# Patient Record
Sex: FEMALE | Race: BLACK OR AFRICAN AMERICAN | NOT HISPANIC OR LATINO | Employment: FULL TIME | URBAN - METROPOLITAN AREA
[De-identification: names, ages, dates, MRNs, and addresses within clinical notes are randomized per-mention and may not be internally consistent; named-entity substitution may affect disease eponyms.]

---

## 2023-11-28 ENCOUNTER — LAB (OUTPATIENT)
Dept: LAB | Facility: LAB | Age: 28
End: 2023-11-28
Payer: COMMERCIAL

## 2023-11-28 ENCOUNTER — DOCUMENTATION (OUTPATIENT)
Dept: OBSTETRICS AND GYNECOLOGY | Facility: HOSPITAL | Age: 28
End: 2023-11-28

## 2023-11-28 ENCOUNTER — INITIAL PRENATAL (OUTPATIENT)
Dept: OBSTETRICS AND GYNECOLOGY | Facility: HOSPITAL | Age: 28
End: 2023-11-28
Payer: COMMERCIAL

## 2023-11-28 ENCOUNTER — HOSPITAL ENCOUNTER (OUTPATIENT)
Dept: RADIOLOGY | Facility: HOSPITAL | Age: 28
Discharge: HOME | End: 2023-11-28
Payer: COMMERCIAL

## 2023-11-28 ENCOUNTER — PHARMACY VISIT (OUTPATIENT)
Dept: PHARMACY | Facility: CLINIC | Age: 28
End: 2023-11-28

## 2023-11-28 ENCOUNTER — TELEPHONE (OUTPATIENT)
Dept: OBSTETRICS AND GYNECOLOGY | Facility: HOSPITAL | Age: 28
End: 2023-11-28

## 2023-11-28 VITALS — WEIGHT: 149 LBS | SYSTOLIC BLOOD PRESSURE: 118 MMHG | DIASTOLIC BLOOD PRESSURE: 70 MMHG

## 2023-11-28 DIAGNOSIS — Z34.82 SUPERVISION OF NORMAL INTRAUTERINE PREGNANCY IN MULTIGRAVIDA IN SECOND TRIMESTER (HHS-HCC): Primary | ICD-10-CM

## 2023-11-28 DIAGNOSIS — Z34.82 SUPERVISION OF NORMAL INTRAUTERINE PREGNANCY IN MULTIGRAVIDA IN SECOND TRIMESTER (HHS-HCC): ICD-10-CM

## 2023-11-28 DIAGNOSIS — R45.89 DEPRESSED MOOD: Primary | ICD-10-CM

## 2023-11-28 DIAGNOSIS — R45.89 DEPRESSED MOOD: ICD-10-CM

## 2023-11-28 DIAGNOSIS — Z59.41 FOOD INSECURITY: ICD-10-CM

## 2023-11-28 LAB
ABO GROUP (TYPE) IN BLOOD: NORMAL
ANTIBODY SCREEN: NORMAL
ERYTHROCYTE [DISTWIDTH] IN BLOOD BY AUTOMATED COUNT: 11.6 % (ref 11.5–14.5)
HBV SURFACE AG SERPL QL IA: NONREACTIVE
HCT VFR BLD AUTO: 33.8 % (ref 36–46)
HCV AB SER QL: NONREACTIVE
HGB BLD-MCNC: 11.1 G/DL (ref 12–16)
HIV 1+2 AB+HIV1 P24 AG SERPL QL IA: NONREACTIVE
MCH RBC QN AUTO: 32.3 PG (ref 26–34)
MCHC RBC AUTO-ENTMCNC: 32.8 G/DL (ref 32–36)
MCV RBC AUTO: 98 FL (ref 80–100)
NRBC BLD-RTO: 0 /100 WBCS (ref 0–0)
PLATELET # BLD AUTO: 224 X10*3/UL (ref 150–450)
RBC # BLD AUTO: 3.44 X10*6/UL (ref 4–5.2)
REFLEX ADDED, ANEMIA PANEL: NORMAL
RH FACTOR (ANTIGEN D): NORMAL
RUBV IGG SERPL IA-ACNC: 4.6 IA
RUBV IGG SERPL QL IA: POSITIVE
T PALLIDUM AB SER QL: NONREACTIVE
WBC # BLD AUTO: 7.9 X10*3/UL (ref 4.4–11.3)

## 2023-11-28 PROCEDURE — 87340 HEPATITIS B SURFACE AG IA: CPT

## 2023-11-28 PROCEDURE — 83020 HEMOGLOBIN ELECTROPHORESIS: CPT

## 2023-11-28 PROCEDURE — 87800 DETECT AGNT MULT DNA DIREC: CPT

## 2023-11-28 PROCEDURE — 86900 BLOOD TYPING SEROLOGIC ABO: CPT

## 2023-11-28 PROCEDURE — 88141 CYTOPATH C/V INTERPRET: CPT | Performed by: PATHOLOGY

## 2023-11-28 PROCEDURE — 87389 HIV-1 AG W/HIV-1&-2 AB AG IA: CPT

## 2023-11-28 PROCEDURE — 88175 CYTOPATH C/V AUTO FLUID REDO: CPT | Mod: TC

## 2023-11-28 PROCEDURE — 36415 COLL VENOUS BLD VENIPUNCTURE: CPT

## 2023-11-28 PROCEDURE — 99215 OFFICE O/P EST HI 40 MIN: CPT | Mod: 25

## 2023-11-28 PROCEDURE — 86317 IMMUNOASSAY INFECTIOUS AGENT: CPT

## 2023-11-28 PROCEDURE — 85027 COMPLETE CBC AUTOMATED: CPT

## 2023-11-28 PROCEDURE — 76815 OB US LIMITED FETUS(S): CPT

## 2023-11-28 PROCEDURE — 87661 TRICHOMONAS VAGINALIS AMPLIF: CPT

## 2023-11-28 PROCEDURE — 99205 OFFICE O/P NEW HI 60 MIN: CPT

## 2023-11-28 PROCEDURE — 83021 HEMOGLOBIN CHROMOTOGRAPHY: CPT

## 2023-11-28 PROCEDURE — 86803 HEPATITIS C AB TEST: CPT

## 2023-11-28 PROCEDURE — 87624 HPV HI-RISK TYP POOLED RSLT: CPT | Mod: 59

## 2023-11-28 PROCEDURE — 86850 RBC ANTIBODY SCREEN: CPT

## 2023-11-28 PROCEDURE — 87086 URINE CULTURE/COLONY COUNT: CPT

## 2023-11-28 PROCEDURE — 86901 BLOOD TYPING SEROLOGIC RH(D): CPT

## 2023-11-28 PROCEDURE — 76815 OB US LIMITED FETUS(S): CPT | Performed by: OBSTETRICS & GYNECOLOGY

## 2023-11-28 PROCEDURE — 86780 TREPONEMA PALLIDUM: CPT

## 2023-11-28 RX ORDER — ONDANSETRON 4 MG/1
4 TABLET, ORALLY DISINTEGRATING ORAL EVERY 8 HOURS PRN
Qty: 20 TABLET | Refills: 0 | Status: SHIPPED | OUTPATIENT
Start: 2023-11-28 | End: 2023-12-05

## 2023-11-28 RX ORDER — NAPROXEN SODIUM 220 MG/1
162 TABLET, FILM COATED ORAL DAILY
Qty: 180 TABLET | Refills: 3 | Status: SHIPPED | OUTPATIENT
Start: 2023-11-28 | End: 2024-11-27

## 2023-11-28 SDOH — ECONOMIC STABILITY - FOOD INSECURITY: FOOD INSECURITY: Z59.41

## 2023-11-28 ASSESSMENT — EDINBURGH POSTNATAL DEPRESSION SCALE (EPDS)
I HAVE BEEN SO UNHAPPY THAT I HAVE HAD DIFFICULTY SLEEPING: YES, MOST OF THE TIME
I HAVE FELT SCARED OR PANICKY FOR NO GOOD REASON: YES, SOMETIMES
I HAVE FELT SAD OR MISERABLE: YES, QUITE OFTEN
TOTAL SCORE: 22
I HAVE BLAMED MYSELF UNNECESSARILY WHEN THINGS WENT WRONG: YES, MOST OF THE TIME
I HAVE BEEN ANXIOUS OR WORRIED FOR NO GOOD REASON: YES, VERY OFTEN
THE THOUGHT OF HARMING MYSELF HAS OCCURRED TO ME: SOMETIMES
THINGS HAVE BEEN GETTING ON TOP OF ME: YES, MOST OF THE TIME I HAVEN'T BEEN ABLE TO COPE AT ALL
I HAVE BEEN ABLE TO LAUGH AND SEE THE FUNNY SIDE OF THINGS: DEFINITELY NOT SO MUCH NOW
I HAVE LOOKED FORWARD WITH ENJOYMENT TO THINGS: RATHER LESS THAN I USED TO
I HAVE BEEN SO UNHAPPY THAT I HAVE BEEN CRYING: ONLY OCCASIONALLY

## 2023-11-28 NOTE — PROGRESS NOTES
Assessment   Problem List Items Addressed This Visit             ICD-10-CM    Depressed mood R45.89     [place] 22; patient agreeable to  IOP; tasked RN pool for appointment ASAP.         Food insecurity Z59.41     [facility] referral placed         Relevant Orders    Referral to [facility]     Other Visit Diagnoses         Codes    Supervision of normal intrauterine pregnancy in multigravida in second trimester    -  Primary Z34.82    Relevant Medications    ondansetron ODT (Zofran-ODT) 4 mg disintegrating tablet    doxylamine (Unisom) 25 mg tablet    aspirin 81 mg chewable tablet    prenatal vitamin, iron-folic, 27 mg iron-800 mcg folic acid tablet    Other Relevant Orders    [facility] OB imaging order    CBC Anemia Panel With Reflex,Pregnancy    Type And Screen    HEMOGLOBIN IDENTIFICATION WITH PATH REVIEW    Hepatitis B surface antigen    Hepatitis C antibody    Rubella Antibody, IgG    Syphilis Screen with Reflex    HIV 1/2 Antigen/Antibody Screen with Reflex to Confirmation    Urine Culture    C. Trachomatis / N. Gonorrhoeae, Amplified Detection    Trichomonas vaginalis, Nucleic Acid Detection    THINPREP PAP    Follow Up In Obstetrics            Plan   NOB plan: New OB resources provided and reviewed with particular attention to dietary, travel, and medication restrictions  Oriented to practice, CNM vs. MD care  Reviewed IOM recommendations for weight gain given pt's BMI: 25-35 pounds (BMI 18.5 - 24.9)  Reviewed bleeding precautions, warning signs, when to call provider; phone number provided  Discussed bASA for PEC prophylaxis  The following Rx were sent to pharmacy: PNV, zofran, unisom, baby aspirin  Routine NOB labs ordered  Dating ultrasound ordered  [place] 22; has no SI or HI today; tasked RN pool for engagement with  IOP ASAP  Return in 4 weeks for routine prenatal care    FELECIA Bustillos-JUDY    Subjective   Skinny Arreola is a 28 y.o.  at 14w3d with a  working estimated date of delivery of 2024, by Ultrasound who presents for an initial prenatal visit. This pregnancy is unplanned.    Patient currently experiencing: nausea/vomitting, desires anti-emetics, fatigue, depression  Bleeding or cramping since LMP: no  Taking prenatal vitamin: No  Ultrasound completed this pregnancy: Yes - outside of ; PAZ 2024    Last pap:     OB History    Para Term  AB Living   3 1 1 0 1 1   SAB IAB Ectopic Multiple Live Births   1 0 0 0 1      # Outcome Date GA Lbr [place]/2nd Weight Sex Delivery Anes PTL Lv   3 Current            2 Term 21    M Vag-Spont EPI  KOJO   1 SAB     U    FD     [place]  Depression Scale Total: 22  Prior pregnancy complications: pre-eclampsia    History of hypertension:  Yes, preeclampsia    Past Medical History:   Diagnosis Date    Chlamydia       History reviewed. No pertinent surgical history.   Social History     Socioeconomic History    Marital status: Single     Spouse name: None    Number of children: None    Years of education: None    Highest education level: None   Occupational History    None   Tobacco Use    Smoking status: Every Day     Types: Cigarettes    Smokeless tobacco: Never   Vaping Use    Vaping Use: Former   Substance and Sexual Activity    Alcohol use: Yes     Alcohol/week: 1.0 standard drink of alcohol     Types: 1 Glasses of wine per week    Drug use: Yes     Types: Marijuana    Sexual activity: Yes     Partners: Male   Other Topics Concern    None   Social History Narrative    None     Social Determinants of Health     Financial Resource Strain: Not on file   Food Insecurity: Not on file   Transportation Needs: Not on file   Physical Activity: Not on file   Stress: Not on file   Social Connections: Not on file   Intimate Partner Violence: Not on file        Objective   Physical Exam  Weight: 67.6 kg (149 lb)  TWG: Not found.   Expected Total Weight Gain: Could not be calculated   Pregravid  BMI: Could not be calculated  BP: 118/70  FHR: 145bpm    Review of Systems   All other systems reviewed and are negative.       Physical Exam  Constitutional:       Appearance: Normal appearance.   Genitourinary:      Vulva, bladder, rectum and urethral meatus normal.      Vaginal cuff intact.     No vaginal prolapse present.     No vaginal atrophy present.  HENT:      Head: Normocephalic and atraumatic.      Nose: Nose normal.      Mouth/Throat:      Mouth: Mucous membranes are moist.   Cardiovascular:      Rate and Rhythm: Normal rate and regular rhythm.      Heart sounds: Normal heart sounds.   Pulmonary:      Effort: Pulmonary effort is normal.      Breath sounds: Normal breath sounds.   Abdominal:      Comments: gravid   Musculoskeletal:         General: Normal range of motion.      Cervical back: Normal range of motion and neck supple.   Neurological:      Mental Status: She is alert and oriented to person, place, and time.   Skin:     General: Skin is warm and dry.   Psychiatric:         Mood and Affect: Mood normal.         Behavior: Behavior normal.         Thought Content: Thought content normal.         Judgment: Judgment normal.          Postpartum Depression: High Risk (2023)    [place]  Depression Scale     Last EPDS Total Score: 22     Last EPDS Self Harm Result: Sometimes        Pregnancy Problems (from 23 to present)       No problems associated with this episode.

## 2023-11-28 NOTE — ASSESSMENT & PLAN NOTE
[facility] referral placed  
[place] 22; patient agreeable to  IOP; tasked RN pool for appointment ASAP.  
Former smoker

## 2023-11-28 NOTE — TELEPHONE ENCOUNTER
----- Message from SANGEETA Bustillos sent at 2023 12:04 PM EST -----  Regarding: Perintatal IOP  Please set this patient up with  IOP ASAP.  [place] 22.  She states her phone is having issues right now, so I'm not sure if there is another way for them to reach out to her.    Thank you.    SANGEETA Bustillos

## 2023-11-29 ENCOUNTER — TELEPHONE (OUTPATIENT)
Dept: OBSTETRICS AND GYNECOLOGY | Facility: HOSPITAL | Age: 28
End: 2023-11-29
Payer: COMMERCIAL

## 2023-11-29 ENCOUNTER — PHARMACY VISIT (OUTPATIENT)
Dept: PHARMACY | Facility: CLINIC | Age: 28
End: 2023-11-29
Payer: MEDICAID

## 2023-11-29 DIAGNOSIS — O23.592 TRICHOMONAL VAGINITIS DURING PREGNANCY IN SECOND TRIMESTER (HHS-HCC): Primary | ICD-10-CM

## 2023-11-29 DIAGNOSIS — A59.01 TRICHOMONAL VAGINITIS DURING PREGNANCY IN SECOND TRIMESTER (HHS-HCC): Primary | ICD-10-CM

## 2023-11-29 LAB
C TRACH RRNA SPEC QL NAA+PROBE: NEGATIVE
HEMOGLOBIN A2: 1.6 % (ref 2–3.5)
HEMOGLOBIN A: 96 % (ref 95.8–98)
HEMOGLOBIN F: 1.2 % (ref 0–2)
HEMOGLOBIN IDENTIFICATION INTERPRETATION: ABNORMAL
HEMOGLOBIN OTHER: 1.2 %
N GONORRHOEA DNA SPEC QL PROBE+SIG AMP: NEGATIVE
PATH REVIEW-HGB IDENTIFICATION: ABNORMAL
T VAGINALIS RRNA SPEC QL NAA+PROBE: POSITIVE

## 2023-11-29 RX ORDER — METRONIDAZOLE 500 MG/1
500 TABLET ORAL 2 TIMES DAILY
Qty: 14 TABLET | Refills: 0 | Status: SHIPPED | OUTPATIENT
Start: 2023-11-29 | End: 2023-12-06

## 2023-11-29 NOTE — TELEPHONE ENCOUNTER
Attempt to call patient can't receive call at this time    ----- Message from SANGEETA Bustillos sent at 11/29/2023  1:37 PM EST -----  Regarding: trich results  This patient saw me for a NOB yesterday and she is positive for trich.  Please call her and let her know that I sent medication to the pharmacy for her and that she will need a test of cure at her next visit.    SANGEETA Bustillos

## 2023-11-30 LAB — BACTERIA UR CULT: ABNORMAL

## 2023-12-01 NOTE — TELEPHONE ENCOUNTER
Several attempts have been made to contact the patient to give results and notify medication sent to pharmacy  RN called patient at the number listed in her chart on 11/29, 11/30, and on 12/01  A letter was sent on 12/1 encouraging patient to call for results.   [phone]  This same letter provided information on the importance of the return call for results  No further attempts will be made to contact patient, provider notified via task  PEDRO Brandt      ----- Message from SANGEETA Bustillos sent at 11/29/2023  1:37 PM EST -----  Regarding: trich results  This patient saw me for a NOB yesterday and she is positive for trich.  Please call her and let her know that I sent medication to the pharmacy for her and that she will need a test of cure at her next visit.    SANGEETA Bustillos

## 2023-12-06 PROBLEM — A59.01 TRICHOMONAL VAGINITIS DURING PREGNANCY IN SECOND TRIMESTER (HHS-HCC): Status: ACTIVE | Noted: 2023-12-06

## 2023-12-06 PROBLEM — D64.9: Status: ACTIVE | Noted: 2023-12-06

## 2023-12-06 PROBLEM — O23.592 TRICHOMONAL VAGINITIS DURING PREGNANCY IN SECOND TRIMESTER (HHS-HCC): Status: ACTIVE | Noted: 2023-12-06

## 2023-12-15 LAB
CYTOLOGY CMNT CVX/VAG CYTO-IMP: NORMAL
HPV HR 12 DNA GENITAL QL NAA+PROBE: NEGATIVE
HPV HR GENOTYPES PNL CVX NAA+PROBE: NEGATIVE
HPV16 DNA SPEC QL NAA+PROBE: NEGATIVE
HPV18 DNA SPEC QL NAA+PROBE: NEGATIVE
LAB AP HPV GENOTYPE QUESTION: YES
LAB AP HPV HR: NORMAL
LABORATORY COMMENT REPORT: NORMAL
PATH REPORT.TOTAL CANCER: NORMAL

## 2024-01-02 ENCOUNTER — HOSPITAL ENCOUNTER (OUTPATIENT)
Dept: RADIOLOGY | Facility: HOSPITAL | Age: 29
Discharge: HOME | End: 2024-01-02
Payer: COMMERCIAL

## 2024-01-02 ENCOUNTER — APPOINTMENT (OUTPATIENT)
Dept: OBSTETRICS AND GYNECOLOGY | Facility: HOSPITAL | Age: 29
End: 2024-01-02
Payer: COMMERCIAL

## 2024-01-02 ENCOUNTER — TELEPHONE (OUTPATIENT)
Dept: OBSTETRICS AND GYNECOLOGY | Facility: HOSPITAL | Age: 29
End: 2024-01-02

## 2024-01-02 DIAGNOSIS — O99.330 SMOKING (TOBACCO) COMPLICATING PREGNANCY, UNSPECIFIED TRIMESTER (HHS-HCC): ICD-10-CM

## 2024-01-02 DIAGNOSIS — Z36.89 SCREENING, ANTENATAL, FOR FETAL ANATOMIC SURVEY (HHS-HCC): ICD-10-CM

## 2024-01-02 DIAGNOSIS — Z3A.19 19 WEEKS GESTATION OF PREGNANCY (HHS-HCC): ICD-10-CM

## 2024-01-02 PROCEDURE — 76805 OB US >/= 14 WKS SNGL FETUS: CPT

## 2024-01-02 PROCEDURE — 76805 OB US >/= 14 WKS SNGL FETUS: CPT | Performed by: OBSTETRICS & GYNECOLOGY

## 2024-01-17 ENCOUNTER — DOCUMENTATION (OUTPATIENT)
Dept: OBSTETRICS AND GYNECOLOGY | Facility: HOSPITAL | Age: 29
End: 2024-01-17
Payer: MEDICAID

## 2024-01-17 NOTE — PROGRESS NOTES
RN received returned cert letter [id number] [phone]  Return to sender attempted not know unable to forward  RAMAKRISHNA BrandtN-RN

## 2024-01-30 ENCOUNTER — ROUTINE PRENATAL (OUTPATIENT)
Dept: OBSTETRICS AND GYNECOLOGY | Facility: HOSPITAL | Age: 29
End: 2024-01-30
Payer: COMMERCIAL

## 2024-01-30 VITALS — DIASTOLIC BLOOD PRESSURE: 71 MMHG | WEIGHT: 160 LBS | SYSTOLIC BLOOD PRESSURE: 114 MMHG

## 2024-01-30 DIAGNOSIS — R45.89 DEPRESSED MOOD: ICD-10-CM

## 2024-01-30 DIAGNOSIS — Z3A.23 23 WEEKS GESTATION OF PREGNANCY (HHS-HCC): Primary | ICD-10-CM

## 2024-01-30 DIAGNOSIS — Z34.82 SUPERVISION OF NORMAL INTRAUTERINE PREGNANCY IN MULTIGRAVIDA IN SECOND TRIMESTER (HHS-HCC): ICD-10-CM

## 2024-01-30 PROCEDURE — 87661 TRICHOMONAS VAGINALIS AMPLIF: CPT | Performed by: ADVANCED PRACTICE MIDWIFE

## 2024-01-30 PROCEDURE — 87086 URINE CULTURE/COLONY COUNT: CPT | Performed by: ADVANCED PRACTICE MIDWIFE

## 2024-01-30 ASSESSMENT — ENCOUNTER SYMPTOMS
EYES NEGATIVE: 0
GASTROINTESTINAL NEGATIVE: 0
HEMATOLOGIC/LYMPHATIC NEGATIVE: 0
PSYCHIATRIC NEGATIVE: 0
NEUROLOGICAL NEGATIVE: 0
CONSTITUTIONAL NEGATIVE: 0
ENDOCRINE NEGATIVE: 0
MUSCULOSKELETAL NEGATIVE: 0
ALLERGIC/IMMUNOLOGIC NEGATIVE: 0
CARDIOVASCULAR NEGATIVE: 0
RESPIRATORY NEGATIVE: 0

## 2024-01-30 NOTE — PROGRESS NOTES
Subjective     Skinny Arreola is a 28 y.o.  at 23w3d with a working estimated date of delivery of 2024, by Ultrasound who presents for a routine prenatal visit.     She denies vaginal bleeding, leakage of fluid, decreased fetal movements, or contractions.    Starting to have heartburn   Seen @ [facility] for round ligament pain a couple weeks ago   Difficulty staying asleep at night - falls asleep at 8-9pm, wakes up every 1-2 hours    Hx of depression - Talks to counselor over the phone, no meds  Trying to get some time for herself, more time out of the house   Denies SI/HI   Reports mood is a little better     Did not know about +Trich> Reviewed attempted phone calls to patient. Advised to check phone number with      Objective   Physical Exam  Weight: 72.6 kg (160 lb)  Expected Total Weight Gain: Could not be calculated   Pregravid BMI: Could not be calculated  BP: 114/71  Fetal Heart Rate: 154 Fundal Height (cm): 24 cm    Postpartum Depression: High Risk (2023)    [place]  Depression Scale     Last EPDS Total Score: 22     Last EPDS Self Harm Result: Sometimes       Problem List Items Addressed This Visit       Depressed mood    Supervision of normal intrauterine pregnancy in multigravida in second trimester    Relevant Orders    Trichomonas vaginalis, Nucleic Acid Detection    Urine Culture    23 weeks gestation of pregnancy - Primary       Discussed diabetes screening and routine labs, to be completed next visit  Repeat urine culture  for possible contamination   Reviewed normal anatomy US  Reviewed +Trich --> patient reports she was unaware and requests another test for confirmation. Culture sent   Declines additional mental health resources at this time   -Reviewed reasons to call CNM on-call: decreased fetal movement, vaginal bleeding, loss of fluid, increased pelvic pain/contractions, or any questions/concerns  -RTC in 3 weeks or prn    Roxanne Villavicencio, FELECIA-JUDY, APRN-CNP

## 2024-01-31 LAB — T VAGINALIS RRNA SPEC QL NAA+PROBE: POSITIVE

## 2024-02-01 ENCOUNTER — TELEPHONE (OUTPATIENT)
Dept: OBSTETRICS AND GYNECOLOGY | Facility: HOSPITAL | Age: 29
End: 2024-02-01
Payer: MEDICAID

## 2024-02-01 DIAGNOSIS — A59.9 TRICHOMONAL INFECTION: Primary | ICD-10-CM

## 2024-02-01 LAB — BACTERIA UR CULT: NORMAL

## 2024-02-01 RX ORDER — METRONIDAZOLE 500 MG/1
500 TABLET ORAL 2 TIMES DAILY
Qty: 14 TABLET | Refills: 0 | Status: SHIPPED | OUTPATIENT
Start: 2024-02-01 | End: 2024-02-08

## 2024-02-01 NOTE — TELEPHONE ENCOUNTER
Attempted to call patient to discuss test results.   Unable to reach patient, left voicemail with office phone number encouraging patient to call back to discuss results.     Angélica CONNELL, RN    ----- Message from SANGEETA Orozco sent at 2/1/2024  2:17 PM EST -----  I sent in her RX    Pregnant  Needs partner treatment, condom use until negative results

## 2024-02-02 ENCOUNTER — TELEPHONE (OUTPATIENT)
Dept: OBSTETRICS AND GYNECOLOGY | Facility: HOSPITAL | Age: 29
End: 2024-02-02
Payer: MEDICAID

## 2024-02-02 NOTE — TELEPHONE ENCOUNTER
Attempted to call patient to discuss results.  Left voicemail for her to call back.      RAMAKRISHNA NguyenN RN

## 2024-02-05 ENCOUNTER — TELEPHONE (OUTPATIENT)
Dept: OBSTETRICS AND GYNECOLOGY | Facility: HOSPITAL | Age: 29
End: 2024-02-05
Payer: MEDICAID

## 2024-02-05 NOTE — TELEPHONE ENCOUNTER
Attempted to call patient to discuss results.  Left voicemail for patient to call back at earliest convenience.     Third attempt to contact patient, certified letter sent on 02/05/24.   Tracking # [phone]    BECKI Nguyen RN      ----- Message from SANGEETA Orozco sent at 2/1/2024  2:17 PM EST -----  I sent in her RX    Pregnant  Needs partner treatment, condom use until negative results

## 2024-02-20 ENCOUNTER — LAB (OUTPATIENT)
Dept: LAB | Facility: LAB | Age: 29
End: 2024-02-20
Payer: MEDICAID

## 2024-02-20 ENCOUNTER — ROUTINE PRENATAL (OUTPATIENT)
Dept: OBSTETRICS AND GYNECOLOGY | Facility: HOSPITAL | Age: 29
End: 2024-02-20
Payer: MEDICAID

## 2024-02-20 VITALS — DIASTOLIC BLOOD PRESSURE: 73 MMHG | WEIGHT: 156 LBS | SYSTOLIC BLOOD PRESSURE: 129 MMHG

## 2024-02-20 DIAGNOSIS — Z3A.26 26 WEEKS GESTATION OF PREGNANCY (HHS-HCC): ICD-10-CM

## 2024-02-20 DIAGNOSIS — Z3A.26 26 WEEKS GESTATION OF PREGNANCY (HHS-HCC): Primary | ICD-10-CM

## 2024-02-20 DIAGNOSIS — G47.00 INSOMNIA, UNSPECIFIED TYPE: ICD-10-CM

## 2024-02-20 LAB
ERYTHROCYTE [DISTWIDTH] IN BLOOD BY AUTOMATED COUNT: 12 % (ref 11.5–14.5)
GLUCOSE 1H P 50 G GLC PO SERPL-MCNC: 99 MG/DL
HCT VFR BLD AUTO: 34 % (ref 36–46)
HGB BLD-MCNC: 11.2 G/DL (ref 12–16)
MCH RBC QN AUTO: 32.1 PG (ref 26–34)
MCHC RBC AUTO-ENTMCNC: 32.9 G/DL (ref 32–36)
MCV RBC AUTO: 97 FL (ref 80–100)
NRBC BLD-RTO: 0 /100 WBCS (ref 0–0)
PLATELET # BLD AUTO: 191 X10*3/UL (ref 150–450)
RBC # BLD AUTO: 3.49 X10*6/UL (ref 4–5.2)
REFLEX ADDED, ANEMIA PANEL: NORMAL
TREPONEMA PALLIDUM IGG+IGM AB [PRESENCE] IN SERUM OR PLASMA BY IMMUNOASSAY: NONREACTIVE
WBC # BLD AUTO: 9.7 X10*3/UL (ref 4.4–11.3)

## 2024-02-20 PROCEDURE — 99213 OFFICE O/P EST LOW 20 MIN: CPT | Mod: TH

## 2024-02-20 PROCEDURE — 85027 COMPLETE CBC AUTOMATED: CPT

## 2024-02-20 PROCEDURE — 99213 OFFICE O/P EST LOW 20 MIN: CPT

## 2024-02-20 PROCEDURE — 82947 ASSAY GLUCOSE BLOOD QUANT: CPT

## 2024-02-20 PROCEDURE — 86780 TREPONEMA PALLIDUM: CPT

## 2024-02-20 PROCEDURE — RXMED WILLOW AMBULATORY MEDICATION CHARGE

## 2024-02-20 PROCEDURE — 36415 COLL VENOUS BLD VENIPUNCTURE: CPT

## 2024-02-20 ASSESSMENT — EDINBURGH POSTNATAL DEPRESSION SCALE (EPDS)
I HAVE BEEN ANXIOUS OR WORRIED FOR NO GOOD REASON: YES, VERY OFTEN
I HAVE BLAMED MYSELF UNNECESSARILY WHEN THINGS WENT WRONG: YES, MOST OF THE TIME
THINGS HAVE BEEN GETTING ON TOP OF ME: YES, MOST OF THE TIME I HAVEN'T BEEN ABLE TO COPE AT ALL
I HAVE FELT SCARED OR PANICKY FOR NO GOOD REASON: YES, SOMETIMES
I HAVE FELT SAD OR MISERABLE: YES, QUITE OFTEN
I HAVE BEEN SO UNHAPPY THAT I HAVE BEEN CRYING: YES, QUITE OFTEN
TOTAL SCORE: 23
I HAVE LOOKED FORWARD WITH ENJOYMENT TO THINGS: DEFINITELY LESS THAN I USED TO
I HAVE BEEN ABLE TO LAUGH AND SEE THE FUNNY SIDE OF THINGS: NOT QUITE SO MUCH NOW
I HAVE BEEN SO UNHAPPY THAT I HAVE HAD DIFFICULTY SLEEPING: YES, MOST OF THE TIME
THE THOUGHT OF HARMING MYSELF HAS OCCURRED TO ME: SOMETIMES

## 2024-02-20 NOTE — PROGRESS NOTES
"Assessment/Plan   Problem List Items Addressed This Visit    None  Visit Diagnoses         Codes    26 weeks gestation of pregnancy    -  Primary Z3A.26    Relevant Orders    Syphilis Screen with Reflex    CBC Anemia Panel With Reflex,Pregnancy    Glucose, 1 Hour Screen, Pregnancy    Follow Up In Obstetrics    Insomnia, unspecified type     G47.00    Relevant Medications    doxylamine (Unisom) 25 mg tablet          Discussed diabetes screening and routine labs, to be completed today  Remains to have complaints regarding sleep; offered Unisom to assist; patient states she does not like to take pills, but she will consider it if it will help; Rx sent     Hx of depression- called the suicide hotline recently, but \"she hung up on her because everything she was saying was a trigger\".  She then called her spiritual counselor and \"was able to get in a better head space\".  Patient states whenever she has thoughts of self harm or suicide, she thinks about her son and that \"she has too much to lose and I don't want to do that to him.\"  Patient denies current thoughts of SI or HI at appointment today.  Counseled patient to present to the ED if calling her spiritual counselor or her friend is no longer assisting.  Patient stated \"due to my strong psych history, they will put me in a mental institution so fast... the first thing I will do is go to a [facility].\" Patient was in touch with  IOP and \"does not want to see them because all they want to do is put me on medication and I'm not doing that.\"    Will follow up on continued positive trich results and having been called multiple times after confirmation of diagnosis from last visit at next visit.     Reviewed s/sx of PTL, warning signs, fetal movement counts, and when to call provider  Follow up in 2 weeks for a routine prenatal visit.    Saadia Espinoza, FELECIA-JUDY    Subjective     Skinny Arreola is a 28 y.o.  at 26w3d with a working estimated date of " delivery of 2024, by Ultrasound who presents for a routine prenatal visit. She denies vaginal bleeding, leakage of fluid, decreased fetal movements, or contractions.    Her pregnancy is complicated by:  Pregnancy Problems (from 23 to present)       Problem Noted Resolved    Low blood hemoglobin A2 2023 by SANGEETA Bustillos No    Priority:  Medium      Overview Signed 2023 10:55 AM by SANGEETA Bustillos     Per diagnostics, not likely significant.         Trichomonal vaginitis during pregnancy in second trimester 2023 by SANGEETA Bustillos No    Priority:  Medium      Overview Signed 2023 10:56 AM by SANGEETA Bustillos     Certified letter sent due to patient unable to be contacted by phone; NAY at next visit.         Supervision of normal intrauterine pregnancy in multigravida in second trimester 2024 by SANGEETA Lee, APRN-CNP No    23 weeks gestation of pregnancy 2024 by SANGEETA Lee, APRN-CNP No             Objective   Physical Exam  Weight: 70.8 kg (156 lb)  TWG: Not found.  Expected Total Weight Gain: Could not be calculated   Pregravid BMI: Could not be calculated  BP: 129/73  Fetal Heart Rate: 150 Fundal Height (cm): 27 cm    Postpartum Depression: High Risk (2024)    [place]  Depression Scale     Last EPDS Total Score: 23     Last EPDS Self Harm Result: Sometimes       Prenatal Labs  Lab Results   Component Value Date    HGB 11.1 (L) 2023    HCT 33.8 (L) 2023     2023    ABO O 2023    LABRH POS 2023    NEISSGONOAMP Negative 2023    CHLAMTRACAMP Negative 2023    SYPHT Nonreactive 2023    HEPBSAG Nonreactive 2023    HIV1X2 Nonreactive 2023    URINECULTURE Normal genitourinary kim 2024

## 2024-02-27 ENCOUNTER — PHARMACY VISIT (OUTPATIENT)
Dept: PHARMACY | Facility: CLINIC | Age: 29
End: 2024-02-27
Payer: MEDICAID

## 2024-03-05 ENCOUNTER — ROUTINE PRENATAL (OUTPATIENT)
Dept: OBSTETRICS AND GYNECOLOGY | Facility: HOSPITAL | Age: 29
End: 2024-03-05
Payer: MEDICAID

## 2024-03-05 VITALS — DIASTOLIC BLOOD PRESSURE: 78 MMHG | WEIGHT: 161 LBS | SYSTOLIC BLOOD PRESSURE: 114 MMHG

## 2024-03-05 DIAGNOSIS — Z3A.28 28 WEEKS GESTATION OF PREGNANCY (HHS-HCC): Primary | ICD-10-CM

## 2024-03-05 PROCEDURE — 99213 OFFICE O/P EST LOW 20 MIN: CPT | Mod: TH

## 2024-03-05 PROCEDURE — 99213 OFFICE O/P EST LOW 20 MIN: CPT

## 2024-03-05 NOTE — PROGRESS NOTES
Assessment/Plan   Problem List Items Addressed This Visit    None  Visit Diagnoses         Codes    28 weeks gestation of pregnancy    -  Primary Z3A.28    Relevant Orders    [facility] OB imaging order    Follow Up In Obstetrics          Ultrasound ordered per patient request/size borderline greater than dates; patient to schedule  Patient feeling much better today after getting quality sleep with Unisom; will continue to take it.  Other child also in  and she has the ability to take time for herself; will continue to utilize self care techniques to assist with mental health.  Patient desires breast pump; tasked RN  Educated patient on tdap; patient will think about it  NAY for trich at next visit  No other concerns  Reviewed s/sx of PTL, warning signs, fetal movement counts, and when to call provider  Follow up in 2 weeks for a routine prenatal visit.    SANGEETA Bustillos    Bertrand Arreola is a 28 y.o.  at 28w3d with a working estimated date of delivery of 2024, by Ultrasound who presents for a routine prenatal visit. She denies vaginal bleeding, leakage of fluid, decreased fetal movements, or contractions.    Her pregnancy is complicated by:  Pregnancy Problems (from 23 to present)       Problem Noted Resolved    Low blood hemoglobin A2 2023 by SANGEETA Bustillos No    Priority:  Medium      Overview Signed 2023 10:55 AM by SANGEETA Bustillos     Per diagnostics, not likely significant.         Trichomonal vaginitis during pregnancy in second trimester 2023 by SANGEETA Bustillos No    Priority:  Medium      Overview Signed 2023 10:56 AM by SANGEETA Bustillos     Certified letter sent due to patient unable to be contacted by phone; NAY at next visit.         Supervision of normal intrauterine pregnancy in multigravida in second trimester 2024 by SANGEETA Lee, APRN-CNP No    23  weeks gestation of pregnancy 2024 by Roxanne Villavicencio, FELECIA-CNM, FELECIA-CNP No             Objective   Physical Exam:   Weight: 73 kg (161 lb)  TWG: Not found.  Expected Total Weight Gain: Could not be calculated   Pregravid BMI: Could not be calculated  BP: 114/78  Fetal Heart Rate: 140 Fundal Height (cm): 29 cm Presentation: Vertex           Postpartum Depression: High Risk (2024)    [place]  Depression Scale     Last EPDS Total Score: 23     Last EPDS Self Harm Result: Sometimes        Prenatal Labs  Lab Results   Component Value Date    HGB 11.2 (L) 2024    HCT 34.0 (L) 2024     2024    ABO O 2023    LABRH POS 2023    NEISSGONOAMP Negative 2023    CHLAMTRACAMP Negative 2023    SYPHT Nonreactive 2024    HEPBSAG Nonreactive 2023    HIV1X2 Nonreactive 2023    URINECULTURE Normal genitourinary kim 2024     Lab Results   Component Value Date    GLUC1P 99 2024

## 2024-03-18 ENCOUNTER — HOSPITAL ENCOUNTER (OUTPATIENT)
Facility: HOSPITAL | Age: 29
Discharge: HOME | End: 2024-03-19
Attending: OBSTETRICS & GYNECOLOGY | Admitting: OBSTETRICS & GYNECOLOGY
Payer: MEDICAID

## 2024-03-18 DIAGNOSIS — O21.9 VOMITING PREGNANCY (HHS-HCC): ICD-10-CM

## 2024-03-18 DIAGNOSIS — A59.01 TRICHOMONAL VAGINITIS DURING PREGNANCY IN SECOND TRIMESTER (HHS-HCC): Primary | ICD-10-CM

## 2024-03-18 DIAGNOSIS — O23.592 TRICHOMONAL VAGINITIS DURING PREGNANCY IN SECOND TRIMESTER (HHS-HCC): Primary | ICD-10-CM

## 2024-03-18 PROBLEM — F12.90 MARIJUANA USE: Status: ACTIVE | Noted: 2024-03-18

## 2024-03-18 PROBLEM — Z3A.23 23 WEEKS GESTATION OF PREGNANCY (HHS-HCC): Status: RESOLVED | Noted: 2024-01-30 | Resolved: 2024-03-18

## 2024-03-18 LAB
BILIRUBIN, POC: NEGATIVE
BLOOD URINE, POC: POSITIVE
CLARITY, POC: CLEAR
COLOR, POC: NORMAL
GLUCOSE URINE, POC: NEGATIVE
KETONES, POC: POSITIVE
LEUKOCYTE EST, POC: NORMAL
NITRITE, POC: NEGATIVE
PH, POC: 6.5
POC APPEARANCE OF BODY FLUID: NORMAL
SPECIFIC GRAVITY, POC: 1.03
URINE PROTEIN, POC: NORMAL
UROBILINOGEN, POC: 1

## 2024-03-18 PROCEDURE — 2500000005 HC RX 250 GENERAL PHARMACY W/O HCPCS: Performed by: ADVANCED PRACTICE MIDWIFE

## 2024-03-18 PROCEDURE — 59025 FETAL NON-STRESS TEST: CPT | Performed by: ADVANCED PRACTICE MIDWIFE

## 2024-03-18 PROCEDURE — 99214 OFFICE O/P EST MOD 30 MIN: CPT | Performed by: ADVANCED PRACTICE MIDWIFE

## 2024-03-18 PROCEDURE — 87661 TRICHOMONAS VAGINALIS AMPLIF: CPT | Performed by: ADVANCED PRACTICE MIDWIFE

## 2024-03-18 RX ORDER — LIDOCAINE HYDROCHLORIDE 10 MG/ML
0.5 INJECTION INFILTRATION; PERINEURAL ONCE AS NEEDED
Status: DISCONTINUED | OUTPATIENT
Start: 2024-03-18 | End: 2024-03-19 | Stop reason: HOSPADM

## 2024-03-18 RX ORDER — ACETAMINOPHEN 325 MG/1
975 TABLET ORAL ONCE
Status: COMPLETED | OUTPATIENT
Start: 2024-03-18 | End: 2024-03-18

## 2024-03-18 RX ORDER — ONDANSETRON 4 MG/1
4 TABLET, FILM COATED ORAL EVERY 6 HOURS PRN
Status: DISCONTINUED | OUTPATIENT
Start: 2024-03-18 | End: 2024-03-19 | Stop reason: HOSPADM

## 2024-03-18 RX ORDER — ONDANSETRON HYDROCHLORIDE 2 MG/ML
4 INJECTION, SOLUTION INTRAVENOUS EVERY 6 HOURS PRN
Status: DISCONTINUED | OUTPATIENT
Start: 2024-03-18 | End: 2024-03-19 | Stop reason: HOSPADM

## 2024-03-18 RX ADMIN — ONDANSETRON HYDROCHLORIDE 4 MG: 4 TABLET, FILM COATED ORAL at 22:55

## 2024-03-18 RX ADMIN — ACETAMINOPHEN 975 MG: 325 TABLET ORAL at 22:55

## 2024-03-18 SDOH — SOCIAL STABILITY: SOCIAL INSECURITY: VERBAL ABUSE: DENIES

## 2024-03-18 SDOH — ECONOMIC STABILITY: HOUSING INSECURITY: DO YOU FEEL UNSAFE GOING BACK TO THE PLACE WHERE YOU ARE LIVING?: NO

## 2024-03-18 SDOH — HEALTH STABILITY: MENTAL HEALTH: HAVE YOU USED ANY PRESCRIPTION DRUGS OTHER THAN PRESCRIBED IN THE PAST 12 MONTHS?: NO

## 2024-03-18 SDOH — HEALTH STABILITY: MENTAL HEALTH: WERE YOU ABLE TO COMPLETE ALL THE BEHAVIORAL HEALTH SCREENINGS?: YES

## 2024-03-18 SDOH — HEALTH STABILITY: MENTAL HEALTH: NON-SPECIFIC ACTIVE SUICIDAL THOUGHTS (PAST 1 MONTH): NO

## 2024-03-18 SDOH — SOCIAL STABILITY: SOCIAL INSECURITY: HAVE YOU HAD THOUGHTS OF HARMING ANYONE ELSE?: NO

## 2024-03-18 SDOH — HEALTH STABILITY: MENTAL HEALTH: STRENGTHS (MUST CHOOSE TWO): SUPPORT FROM FAMILY;SUPPORT FROM FRIENDS

## 2024-03-18 SDOH — SOCIAL STABILITY: SOCIAL INSECURITY: PHYSICAL ABUSE: DENIES

## 2024-03-18 SDOH — SOCIAL STABILITY: SOCIAL INSECURITY: ABUSE SCREEN: ADULT

## 2024-03-18 SDOH — HEALTH STABILITY: MENTAL HEALTH: SUICIDAL BEHAVIOR (LIFETIME): NO

## 2024-03-18 SDOH — HEALTH STABILITY: MENTAL HEALTH: HAVE YOU USED ANY SUBSTANCES (CANABIS, COCAINE, HEROIN, HALLUCINOGENS, INHALANTS, ETC.) IN THE PAST 12 MONTHS?: YES

## 2024-03-18 SDOH — SOCIAL STABILITY: SOCIAL INSECURITY: ARE THERE ANY APPARENT SIGNS OF INJURIES/BEHAVIORS THAT COULD BE RELATED TO ABUSE/NEGLECT?: NO

## 2024-03-18 SDOH — SOCIAL STABILITY: SOCIAL INSECURITY: ARE YOU OR HAVE YOU BEEN THREATENED OR ABUSED PHYSICALLY, EMOTIONALLY, OR SEXUALLY BY ANYONE?: NO

## 2024-03-18 SDOH — SOCIAL STABILITY: SOCIAL INSECURITY: HAS ANYONE EVER THREATENED TO HURT YOUR FAMILY OR YOUR PETS?: NO

## 2024-03-18 SDOH — SOCIAL STABILITY: SOCIAL INSECURITY: DOES ANYONE TRY TO KEEP YOU FROM HAVING/CONTACTING OTHER FRIENDS OR DOING THINGS OUTSIDE YOUR HOME?: NO

## 2024-03-18 SDOH — HEALTH STABILITY: MENTAL HEALTH: WISH TO BE DEAD (PAST 1 MONTH): NO

## 2024-03-18 SDOH — SOCIAL STABILITY: SOCIAL INSECURITY: DO YOU FEEL ANYONE HAS EXPLOITED OR TAKEN ADVANTAGE OF YOU FINANCIALLY OR OF YOUR PERSONAL PROPERTY?: NO

## 2024-03-18 ASSESSMENT — LIFESTYLE VARIABLES
HOW OFTEN DO YOU HAVE A DRINK CONTAINING ALCOHOL: NEVER
HOW MANY STANDARD DRINKS CONTAINING ALCOHOL DO YOU HAVE ON A TYPICAL DAY: PATIENT DOES NOT DRINK
AUDIT-C TOTAL SCORE: 0
AUDIT-C TOTAL SCORE: 0
HOW OFTEN DO YOU HAVE 6 OR MORE DRINKS ON ONE OCCASION: NEVER
SKIP TO QUESTIONS 9-10: 1

## 2024-03-18 ASSESSMENT — PAIN - FUNCTIONAL ASSESSMENT: PAIN_FUNCTIONAL_ASSESSMENT: 0-10

## 2024-03-18 ASSESSMENT — PAIN SCALES - GENERAL
PAINLEVEL_OUTOF10: 3
PAINLEVEL_OUTOF10: 1

## 2024-03-18 ASSESSMENT — PATIENT HEALTH QUESTIONNAIRE - PHQ9
SUM OF ALL RESPONSES TO PHQ9 QUESTIONS 1 & 2: 0
1. LITTLE INTEREST OR PLEASURE IN DOING THINGS: NOT AT ALL
2. FEELING DOWN, DEPRESSED OR HOPELESS: NOT AT ALL

## 2024-03-18 ASSESSMENT — PAIN DESCRIPTION - LOCATION: LOCATION: ABDOMEN

## 2024-03-19 ENCOUNTER — ROUTINE PRENATAL (OUTPATIENT)
Dept: OBSTETRICS AND GYNECOLOGY | Facility: HOSPITAL | Age: 29
End: 2024-03-19
Payer: MEDICAID

## 2024-03-19 ENCOUNTER — HOSPITAL ENCOUNTER (OUTPATIENT)
Dept: RADIOLOGY | Facility: HOSPITAL | Age: 29
Discharge: HOME | End: 2024-03-19
Payer: MEDICAID

## 2024-03-19 ENCOUNTER — PHARMACY VISIT (OUTPATIENT)
Dept: PHARMACY | Facility: CLINIC | Age: 29
End: 2024-03-19
Payer: MEDICAID

## 2024-03-19 VITALS
HEIGHT: 63 IN | BODY MASS INDEX: 28.91 KG/M2 | DIASTOLIC BLOOD PRESSURE: 71 MMHG | OXYGEN SATURATION: 98 % | TEMPERATURE: 97.2 F | RESPIRATION RATE: 18 BRPM | SYSTOLIC BLOOD PRESSURE: 110 MMHG | HEART RATE: 88 BPM | WEIGHT: 163.14 LBS

## 2024-03-19 VITALS — SYSTOLIC BLOOD PRESSURE: 110 MMHG | WEIGHT: 161 LBS | DIASTOLIC BLOOD PRESSURE: 68 MMHG | BODY MASS INDEX: 28.52 KG/M2

## 2024-03-19 DIAGNOSIS — Z3A.30 30 WEEKS GESTATION OF PREGNANCY (HHS-HCC): Primary | ICD-10-CM

## 2024-03-19 DIAGNOSIS — Z3A.28 28 WEEKS GESTATION OF PREGNANCY (HHS-HCC): ICD-10-CM

## 2024-03-19 LAB — T VAGINALIS RRNA SPEC QL NAA+PROBE: POSITIVE

## 2024-03-19 PROCEDURE — 99215 OFFICE O/P EST HI 40 MIN: CPT | Mod: 27

## 2024-03-19 PROCEDURE — 99213 OFFICE O/P EST LOW 20 MIN: CPT | Mod: TH,25

## 2024-03-19 PROCEDURE — 76819 FETAL BIOPHYS PROFIL W/O NST: CPT | Performed by: OBSTETRICS & GYNECOLOGY

## 2024-03-19 PROCEDURE — 99213 OFFICE O/P EST LOW 20 MIN: CPT

## 2024-03-19 PROCEDURE — 76816 OB US FOLLOW-UP PER FETUS: CPT | Performed by: OBSTETRICS & GYNECOLOGY

## 2024-03-19 PROCEDURE — 76816 OB US FOLLOW-UP PER FETUS: CPT

## 2024-03-19 PROCEDURE — RXMED WILLOW AMBULATORY MEDICATION CHARGE

## 2024-03-19 RX ORDER — ONDANSETRON 4 MG/1
4 TABLET, ORALLY DISINTEGRATING ORAL EVERY 8 HOURS PRN
Qty: 20 TABLET | Refills: 0 | Status: SHIPPED | OUTPATIENT
Start: 2024-03-19

## 2024-03-19 RX ORDER — METRONIDAZOLE 500 MG/1
500 TABLET ORAL 2 TIMES DAILY
Qty: 14 TABLET | Refills: 0 | Status: SHIPPED | OUTPATIENT
Start: 2024-03-19 | End: 2024-03-26

## 2024-03-19 NOTE — SIGNIFICANT EVENT
Pt. Feeling better, has been on phone most of triage visit.   Drank a few glasses of water, no vomiting.  Reports contractions have spaced.   SVE unchanged.   NST reactive- isolated variable decel  x2.   FHT reviewed w/ Dr. Poon. BSUS done by Dr. Meléndez, NATHALIE=10      Discussed need for proper treatment of trichomonas, resent to pharmacy  RX sent for Zofran as needed  Encouraged to attend appointment today  Referral to  IOP  PTL precautions and warning s/s discussed     SANGEETA Orozco

## 2024-03-19 NOTE — PROGRESS NOTES
Assessment/Plan   Problem List Items Addressed This Visit    None  Visit Diagnoses         Codes    30 weeks gestation of pregnancy    -  Primary Z3A.30    Relevant Orders    Follow Up In Obstetrics          Patient to  treatment for trich at the conclusion of this appointment; patient aware that NAY will be performed in 4 weeks  No concerns  Reviewed s/sx of PTL, warning signs, fetal movement counts, and when to call provider  Follow up in 2 weeks for a routine prenatal visit.    SANGEETA Bustillos    Bertrand Arreola is a 28 y.o.  at 30w3d with a working estimated date of delivery of 2024, by Ultrasound who presents for a routine prenatal visit. She denies vaginal bleeding, leakage of fluid, decreased fetal movements, or contractions.    Her pregnancy is complicated by:  Pregnancy Problems (from 23 to present)       Problem Noted Resolved    Low blood hemoglobin A2 2023 by SANGEETA Bustillos No    Priority:  Medium      Overview Signed 2023 10:55 AM by SANGEETA Bustillos     Per diagnostics, not likely significant.         Trichomonal vaginitis during pregnancy in second trimester 2023 by SANGEETA Bustillos No    Priority:  Medium      Overview Signed 2023 10:56 AM by SANGEETA Bustillos     Certified letter sent due to patient unable to be contacted by phone; NAY at next visit.         Supervision of normal intrauterine pregnancy in multigravida in second trimester 2024 by SANGEETA Lee, FELECIA-CNP No    23 weeks gestation of pregnancy 2024 by SANGEETA Lee, FELECIA-MCKENNA 3/18/2024 by SANGEETA Orozco             Objective   Physical Exam:   Weight: 73 kg (161 lb)  TWG: Not found.  Expected Total Weight Gain: Could not be calculated   Pregravid BMI: Could not be calculated  BP: 110/68  Fetal Heart Rate: 135 Fundal Height (cm): 31 cm Presentation: Vertex            Postpartum Depression: High Risk (2024)    [place]  Depression Scale     Last EPDS Total Score: 23     Last EPDS Self Harm Result: Sometimes        Prenatal Labs  Lab Results   Component Value Date    HGB 11.2 (L) 2024    HCT 34.0 (L) 2024     2024    ABO O 2023    LABRH POS 2023    NEISSGONOAMP Negative 2023    CHLAMTRACAMP Negative 2023    SYPHT Nonreactive 2024    HEPBSAG Nonreactive 2023    HIV1X2 Nonreactive 2023    URINECULTURE Normal genitourinary kim 2024     Lab Results   Component Value Date    GLUC1P 99 2024

## 2024-03-19 NOTE — H&P
"Obstetrical Admission History and Physical     Skinny Arreola is a 28 y.o.  @ 30.2 weeks by 10 wk ultrasound.     Chief Complaint: Headache and Abdominal Pain    Assessment/Plan    Dehydration  Threatened PTL  Depression  Headache  N/V  Trichomonas   Reactive NST with variable decel    P:  Zofran  Tylenol  Discussed will need treatment for trichomonas, RX resent   Discussed importance of mental health and healthy coping mechanisms. Discouraged use of marijuana and need for negative drug screen.   Referral to  IOP  Has upcoming WIC appointment, if Zofran is helpful can send outpatient  Recheck cervix in 2 hours   Continuous FHT monitoring    SANGEETA Orozco     Active Problems:    Marijuana use      Pregnancy Problems (from 23 to present)       Problem Noted Resolved    Supervision of normal intrauterine pregnancy in multigravida in second trimester 2024 by SANGEETA Lee, APRN-CNP No    Priority:  Medium      Low blood hemoglobin A2 2023 by SANGEETA Bustillos No    Priority:  Medium      Overview Signed 2023 10:55 AM by SANGEETA Bustillos     Per diagnostics, not likely significant.         Trichomonal vaginitis during pregnancy in second trimester 2023 by SANGEETA Bustillos No    Priority:  Medium      Overview Signed 2023 10:56 AM by SANGEETA Bustillos     Certified letter sent due to patient unable to be contacted by phone; NAY at next visit.         23 weeks gestation of pregnancy 2024 by SANGEETA Lee, DANNY 3/18/2024 by SANGEETA Orozco    Priority:  Medium            Subjective   Skinny is here complaining of  multiple concerns:    Reports \"on/off\" migraines throughout the entire pregnancy which she is experiencing today. Attempted to take 1000mg Tylenol @ 1730 but reports vomiting after.     When questioned about the vomiting, reports this is not an " "acute issue. Denies sick contacts. She has been experiencing vomiting throughout the entire pregnancy. She has not discussed this with her primary midwife and she is not on any anti-emetics. Reports poor weight gain during pregnancy-denies food insecurity.  Last BM  yesterday, which was normal.     Reports contractions that started this evening around 4-5 pm. Irregular in nature, associated with back pain.     Baby is active.  Denies LOF, VB, dysuria, recent intercourse, or abnormal discharge.     Of note, pt was + for Trichomonas 2023 and again 2024.   Pt. Reports not being treated as she was not notified (documentation of multiple phone calls with a certified letter sent)    Reports depressed mood, talks to a counselor weekly. Has a notable history for depression.   Reports trying to \"Stay on top of her mental health\" but admits to sleeping all dayand smoking marijuana.     Obstetrical History   OB History    Para Term  AB Living   3 1 1 0 1 1   SAB IAB Ectopic Multiple Live Births   1 0 0 0 1      # Outcome Date GA Lbr Spencer/2nd Weight Sex Delivery Anes PTL Lv   3 Current            2 Term 21    M Vag-Spont EPI  KOJO   1 SAB     U    FD       Past Medical History  Past Medical History:   Diagnosis Date    Chlamydia     Low blood hemoglobin A2 2023    Trichomonal vaginitis during pregnancy in second trimester 2023        Past Surgical History   No past surgical history on file.    Social History  Social History     Tobacco Use    Smoking status: Every Day     Types: Cigarettes    Smokeless tobacco: Never   Substance Use Topics    Alcohol use: Yes     Alcohol/week: 1.0 standard drink of alcohol     Types: 1 Glasses of wine per week     Substance and Sexual Activity   Drug Use Yes    Types: Marijuana       Allergies  Atomoxetine and Fish derived     Medications  Medications Prior to Admission   Medication Sig Dispense Refill Last Dose    aspirin 81 mg chewable tablet Chew 2 tablets " (162 mg) once daily. 180 tablet 3 Unknown    doxylamine (Unisom) 25 mg tablet Take 1 tablet (25 mg) by mouth as needed at bedtime for sleep. 30 tablet 0 Past Week    prenatal vitamin 28 mg iron-800 mcg folic acid tablet tablet Take 1 tablet by mouth once daily. 90 tablet 3 3/17/2024       Objective    Last Vitals  Temp Pulse Resp BP MAP O2 Sat   36.8 °C (98.2 °F) 93 18 116/75   98 %     Physical Examination  GENERAL: Examination reveals a well developed, well nourished, gravid female in no acute distress. She is alert and cooperative.  ABDOMEN: soft, gravid, nontender, nondistended, no abnormal masses, no epigastric pain  FHR is  , 140, moderate variability with Accelerations, Variable decelerations, and a Category II tracing.    [place] reading:  q 3-5 min   The fetus is in a vertex presentation, determined by vaginal exam  VAGINA: normal appearing vagina with normal color and discharge and no lesions noted  CERVIX: Fingertip cm dilated, 50 % effaced, -3 station; MEMBRANES are Intact  PSYCHOLOGICAL: awake and alert; oriented to person, place, and time    Lab Review  Labs in chart were reviewed.

## 2024-04-17 ENCOUNTER — ROUTINE PRENATAL (OUTPATIENT)
Dept: OBSTETRICS AND GYNECOLOGY | Facility: HOSPITAL | Age: 29
End: 2024-04-17
Payer: MEDICAID

## 2024-04-17 VITALS — SYSTOLIC BLOOD PRESSURE: 125 MMHG | WEIGHT: 162 LBS | DIASTOLIC BLOOD PRESSURE: 84 MMHG | BODY MASS INDEX: 28.7 KG/M2

## 2024-04-17 DIAGNOSIS — B37.9 YEAST INFECTION: ICD-10-CM

## 2024-04-17 DIAGNOSIS — Z3A.34 34 WEEKS GESTATION OF PREGNANCY (HHS-HCC): Primary | ICD-10-CM

## 2024-04-17 DIAGNOSIS — Z3A.30 30 WEEKS GESTATION OF PREGNANCY (HHS-HCC): ICD-10-CM

## 2024-04-17 DIAGNOSIS — O26.893 HEARTBURN DURING PREGNANCY IN THIRD TRIMESTER (HHS-HCC): ICD-10-CM

## 2024-04-17 DIAGNOSIS — R12 HEARTBURN DURING PREGNANCY IN THIRD TRIMESTER (HHS-HCC): ICD-10-CM

## 2024-04-17 PROCEDURE — 99213 OFFICE O/P EST LOW 20 MIN: CPT

## 2024-04-17 PROCEDURE — RXMED WILLOW AMBULATORY MEDICATION CHARGE

## 2024-04-17 PROCEDURE — 99213 OFFICE O/P EST LOW 20 MIN: CPT | Mod: TH

## 2024-04-17 RX ORDER — FLUCONAZOLE 150 MG/1
150 TABLET ORAL ONCE
Qty: 1 TABLET | Refills: 1 | Status: SHIPPED | OUTPATIENT
Start: 2024-04-17 | End: 2024-04-19

## 2024-04-17 RX ORDER — OMEPRAZOLE 20 MG/1
20 CAPSULE, DELAYED RELEASE ORAL DAILY
Qty: 90 CAPSULE | Refills: 3 | Status: SHIPPED | OUTPATIENT
Start: 2024-04-17 | End: 2025-04-17

## 2024-04-17 NOTE — PROGRESS NOTES
Assessment/Plan   Problem List Items Addressed This Visit    None  Visit Diagnoses         Codes    34 weeks gestation of pregnancy ([facility])    -  Primary Z3A.34    Relevant Orders    Follow Up In Obstetrics         Heartburn during pregnancy in third trimester ([facility])     O26.893, R12    Relevant Medications    omeprazole (PriLOSEC) 20 mg DR capsule    Yeast infection     B37.9    Relevant Medications    fluconazole (Diflucan) 150 mg tablet          Discussed routine GBS screening, to be completed next visit  See above; no other concerns  Reviewed s/sx of PTL, warning signs, fetal movement counts, and when to call provider  Follow up in 2 weeks for a routine prenatal visit.    SANGEETA Bustillos    Subjective     Skinny Arreola is a 28 y.o.  at 34w4d with a working estimated date of delivery of 2024, by Ultrasound who presents for a routine prenatal visit. She denies vaginal bleeding, leakage of fluid, decreased fetal movements, or contractions.    Her pregnancy is complicated by:  Pregnancy Problems (from 23 to present)       Problem Noted Resolved    Low blood hemoglobin A2 2023 by SANGEETA Bustillos No    Priority:  Medium      Overview Signed 2023 10:55 AM by SANGEETA Bustillos     Per diagnostics, not likely significant.         Trichomonal vaginitis during pregnancy in second trimester ([facility]) 2023 by SANGEETA Bustillos No    Priority:  Medium      Overview Signed 2023 10:56 AM by SANGEETA Bustillos     Certified letter sent due to patient unable to be contacted by phone; NAY at next visit.         Supervision of normal intrauterine pregnancy in multigravida in second trimester ([facility]) 2024 by SANGEETA Lee, FELECIA-CNP No    23 weeks gestation of pregnancy ([facility]) 2024 by SANGEETA Lee, FELECIA-CNP 3/18/2024 by SANGEETA rOozco             Objective    Physical Exam:   Weight: 73.5 kg (162 lb)  TWG: Not found.  Expected Total Weight Gain: Could not be calculated   Pregravid BMI: Could not be calculated  BP: 125/84  Fetal Heart Rate: 140 Fundal Height (cm): 35 cm Presentation: Vertex           Postpartum Depression: High Risk (2024)    [place]  Depression Scale     Last EPDS Total Score: 23     Last EPDS Self Harm Result: Sometimes        Prenatal Labs  Lab Results   Component Value Date    HGB 11.2 (L) 2024    HCT 34.0 (L) 2024     2024    ABO O 2023    LABRH POS 2023    NEISSGONOAMP Negative 2023    CHLAMTRACAMP Negative 2023    SYPHT Nonreactive 2024    HEPBSAG Nonreactive 2023    HIV1X2 Nonreactive 2023    URINECULTURE Normal genitourinary kim 2024     Lab Results   Component Value Date    GLUC1P 99 2024

## 2024-04-18 ENCOUNTER — PHARMACY VISIT (OUTPATIENT)
Dept: PHARMACY | Facility: CLINIC | Age: 29
End: 2024-04-18
Payer: MEDICAID

## 2024-04-29 ENCOUNTER — TELEPHONE (OUTPATIENT)
Dept: OBSTETRICS AND GYNECOLOGY | Facility: HOSPITAL | Age: 29
End: 2024-04-29

## 2024-04-29 DIAGNOSIS — Z34.83 SUPERVISION OF NORMAL INTRAUTERINE PREGNANCY IN MULTIGRAVIDA IN THIRD TRIMESTER (HHS-HCC): Primary | ICD-10-CM

## 2024-04-30 ASSESSMENT — COLUMBIA-SUICIDE SEVERITY RATING SCALE - C-SSRS
2. HAVE YOU ACTUALLY HAD ANY THOUGHTS OF KILLING YOURSELF?: NO
6. HAVE YOU EVER DONE ANYTHING, STARTED TO DO ANYTHING, OR PREPARED TO DO ANYTHING TO END YOUR LIFE?: NO
1. IN THE PAST MONTH, HAVE YOU WISHED YOU WERE DEAD OR WISHED YOU COULD GO TO SLEEP AND NOT WAKE UP?: NO

## 2024-05-01 ENCOUNTER — PHARMACY VISIT (OUTPATIENT)
Dept: PHARMACY | Facility: CLINIC | Age: 29
End: 2024-05-01
Payer: MEDICAID

## 2024-05-01 ENCOUNTER — HOSPITAL ENCOUNTER (OUTPATIENT)
Facility: HOSPITAL | Age: 29
End: 2024-05-01
Attending: OBSTETRICS & GYNECOLOGY | Admitting: OBSTETRICS & GYNECOLOGY
Payer: MEDICAID

## 2024-05-01 ENCOUNTER — HOSPITAL ENCOUNTER (OUTPATIENT)
Facility: HOSPITAL | Age: 29
Discharge: HOME | End: 2024-05-01
Attending: OBSTETRICS & GYNECOLOGY | Admitting: OBSTETRICS & GYNECOLOGY
Payer: MEDICAID

## 2024-05-01 VITALS
BODY MASS INDEX: 30.04 KG/M2 | HEIGHT: 63 IN | OXYGEN SATURATION: 100 % | SYSTOLIC BLOOD PRESSURE: 111 MMHG | TEMPERATURE: 97.9 F | HEART RATE: 80 BPM | RESPIRATION RATE: 16 BRPM | WEIGHT: 169.53 LBS | DIASTOLIC BLOOD PRESSURE: 76 MMHG

## 2024-05-01 DIAGNOSIS — B37.31 VAGINAL CANDIDIASIS: Primary | ICD-10-CM

## 2024-05-01 LAB
POC APPEARANCE, URINE: CLEAR
POC BILIRUBIN, URINE: NEGATIVE
POC BLOOD, URINE: ABNORMAL
POC COLOR, URINE: YELLOW
POC GLUCOSE, URINE: NEGATIVE MG/DL
POC KETONES, URINE: NEGATIVE MG/DL
POC LEUKOCYTES, URINE: ABNORMAL
POC NITRITE,URINE: NEGATIVE
POC PH, URINE: 7 PH
POC PROTEIN, URINE: NEGATIVE MG/DL
POC SPECIFIC GRAVITY, URINE: 1.01
POC UROBILINOGEN, URINE: 1 EU/DL

## 2024-05-01 PROCEDURE — RXMED WILLOW AMBULATORY MEDICATION CHARGE

## 2024-05-01 PROCEDURE — 99213 OFFICE O/P EST LOW 20 MIN: CPT | Performed by: ADVANCED PRACTICE MIDWIFE

## 2024-05-01 PROCEDURE — 99215 OFFICE O/P EST HI 40 MIN: CPT

## 2024-05-01 RX ORDER — TERCONAZOLE 4 MG/G
1 CREAM VAGINAL NIGHTLY
Qty: 45 G | Refills: 0 | Status: SHIPPED | OUTPATIENT
Start: 2024-05-01 | End: 2024-05-08

## 2024-05-01 SDOH — HEALTH STABILITY: MENTAL HEALTH: HAVE YOU USED ANY PRESCRIPTION DRUGS OTHER THAN PRESCRIBED IN THE PAST 12 MONTHS?: NO

## 2024-05-01 SDOH — SOCIAL STABILITY: SOCIAL INSECURITY: DOES ANYONE TRY TO KEEP YOU FROM HAVING/CONTACTING OTHER FRIENDS OR DOING THINGS OUTSIDE YOUR HOME?: NO

## 2024-05-01 SDOH — HEALTH STABILITY: MENTAL HEALTH: SUICIDAL BEHAVIOR (LIFETIME): NO

## 2024-05-01 SDOH — HEALTH STABILITY: MENTAL HEALTH: WISH TO BE DEAD (PAST 1 MONTH): NO

## 2024-05-01 SDOH — SOCIAL STABILITY: SOCIAL INSECURITY: HAVE YOU HAD THOUGHTS OF HARMING ANYONE ELSE?: YES

## 2024-05-01 SDOH — HEALTH STABILITY: MENTAL HEALTH: HAVE YOU USED ANY SUBSTANCES (CANABIS, COCAINE, HEROIN, HALLUCINOGENS, INHALANTS, ETC.) IN THE PAST 12 MONTHS?: YES

## 2024-05-01 SDOH — SOCIAL STABILITY: SOCIAL INSECURITY: ARE THERE ANY APPARENT SIGNS OF INJURIES/BEHAVIORS THAT COULD BE RELATED TO ABUSE/NEGLECT?: NO

## 2024-05-01 SDOH — HEALTH STABILITY: MENTAL HEALTH: NON-SPECIFIC ACTIVE SUICIDAL THOUGHTS (PAST 1 MONTH): NO

## 2024-05-01 SDOH — ECONOMIC STABILITY: HOUSING INSECURITY: DO YOU FEEL UNSAFE GOING BACK TO THE PLACE WHERE YOU ARE LIVING?: NO

## 2024-05-01 SDOH — SOCIAL STABILITY: SOCIAL INSECURITY: HAVE YOU HAD ANY THOUGHTS OF HARMING ANYONE ELSE?: NO

## 2024-05-01 SDOH — SOCIAL STABILITY: SOCIAL INSECURITY: ARE YOU OR HAVE YOU BEEN THREATENED OR ABUSED PHYSICALLY, EMOTIONALLY, OR SEXUALLY BY ANYONE?: NO

## 2024-05-01 SDOH — SOCIAL STABILITY: SOCIAL INSECURITY: ABUSE SCREEN: ADULT

## 2024-05-01 SDOH — SOCIAL STABILITY: SOCIAL INSECURITY: DO YOU FEEL ANYONE HAS EXPLOITED OR TAKEN ADVANTAGE OF YOU FINANCIALLY OR OF YOUR PERSONAL PROPERTY?: NO

## 2024-05-01 SDOH — SOCIAL STABILITY: SOCIAL INSECURITY: HAS ANYONE EVER THREATENED TO HURT YOUR FAMILY OR YOUR PETS?: NO

## 2024-05-01 SDOH — SOCIAL STABILITY: SOCIAL INSECURITY: PHYSICAL ABUSE: DENIES

## 2024-05-01 SDOH — HEALTH STABILITY: MENTAL HEALTH: WERE YOU ABLE TO COMPLETE ALL THE BEHAVIORAL HEALTH SCREENINGS?: YES

## 2024-05-01 SDOH — SOCIAL STABILITY: SOCIAL INSECURITY: VERBAL ABUSE: DENIES

## 2024-05-01 ASSESSMENT — LIFESTYLE VARIABLES
SKIP TO QUESTIONS 9-10: 1
HOW MANY STANDARD DRINKS CONTAINING ALCOHOL DO YOU HAVE ON A TYPICAL DAY: PATIENT DOES NOT DRINK
AUDIT-C TOTAL SCORE: 0
HOW OFTEN DO YOU HAVE 6 OR MORE DRINKS ON ONE OCCASION: NEVER
HOW OFTEN DO YOU HAVE A DRINK CONTAINING ALCOHOL: NEVER
AUDIT-C TOTAL SCORE: 0

## 2024-05-01 ASSESSMENT — PATIENT HEALTH QUESTIONNAIRE - PHQ9
1. LITTLE INTEREST OR PLEASURE IN DOING THINGS: NOT AT ALL
SUM OF ALL RESPONSES TO PHQ9 QUESTIONS 1 & 2: 0
2. FEELING DOWN, DEPRESSED OR HOPELESS: NOT AT ALL

## 2024-05-01 ASSESSMENT — PAIN SCALES - GENERAL
PAINLEVEL_OUTOF10: 0 - NO PAIN
PAINLEVEL_OUTOF10: 0 - NO PAIN

## 2024-05-01 NOTE — H&P
Obstetrical Admission History and Physical    29 y/o  @ 36.4 presents for ctx, lost mucous plug, and trickle of fluid.  States she was resting and when she got up to go to the bathroom her mucous plug fell out, thinks she had a trickle of fluid after that. She also thinks she has been having ctx tonight, unsure how frequent they are.     Obstetrical History   OB History    Para Term  AB Living   3 1 1 0 1 1   SAB IAB Ectopic Multiple Live Births   1 0 0 0 1      # Outcome Date GA Lbr Spencer/2nd Weight Sex Delivery Anes PTL Lv   3 Current            2 Term 21    M Vag-Spont EPI  KOJO   1 SAB     U    FD       Past Medical History  Past Medical History:   Diagnosis Date    Chlamydia     Low blood hemoglobin A2 2023    Trichomonal vaginitis during pregnancy in second trimester ([facility]) 2023        Past Surgical History   No past surgical history on file.    Social History  Social History     Tobacco Use    Smoking status: Every Day     Types: Cigarettes    Smokeless tobacco: Never   Substance Use Topics    Alcohol use: Yes     Alcohol/week: 1.0 standard drink of alcohol     Types: 1 Glasses of wine per week     Substance and Sexual Activity   Drug Use Yes    Types: Marijuana       Allergies  Atomoxetine and Fish derived     Medications  Medications Prior to Admission   Medication Sig Dispense Refill Last Dose    omeprazole (PriLOSEC) 20 mg DR capsule Take 1 capsule (20 mg) by mouth once daily. Do not crush or chew. 90 capsule 3 Past Week    ondansetron ODT (Zofran-ODT) 4 mg disintegrating tablet Take 1 tablet (4 mg) by mouth every 8 hours if needed for nausea or vomiting. 20 tablet 0 Past Week    aspirin 81 mg chewable tablet Chew 2 tablets (162 mg) once daily. 180 tablet 3 Unknown    doxylamine (Unisom) 25 mg tablet Take 1 tablet (25 mg) by mouth as needed at bedtime for sleep. 30 tablet 0     prenatal vitamin 28 mg iron-800 mcg folic acid tablet tablet Take 1 tablet by mouth once  daily. (Patient not taking: Reported on 2024) 90 tablet 3 More than a month       Objective    Last Vitals  Temp Pulse Resp BP MAP O2 Sat   36.4 °C (97.5 °F) 80 18 111/76   99 %     Physical Examination  GENERAL: Examination reveals a well developed, well nourished, gravid female in no acute distress. She is alert, cooperative, and comfortable appearing.  FHR is 135, mod variability, +accels, no decels.   [place] readin ctx in 20 min.  SSE: neg pooling/nitrazine/ferning, thick yellow curd discharge, wet mount +hyphae  CERVIX: ft cm dilated, 30% effaced, -3 station; MEMBRANES are intact.  PSYCHOLOGICAL: awake and alert; oriented to person, place, and time    A: 29 y/o  @ 36.4  False labor  Intact membranes  Vaginal candidiasis  Reactive NST    P: Discharge home with precautions  Terazol rx sent to pharmacy and instructions given  Follow up with scheduled prenatal appt  or prn    Katty schultz CNM

## 2024-05-01 NOTE — ED TRIAGE NOTES
Pt to ED for contractions, pt states she lost her mucus plug. Pt is 36 weeks 4 days , not high risk, due on

## 2024-05-02 ENCOUNTER — ROUTINE PRENATAL (OUTPATIENT)
Dept: OBSTETRICS AND GYNECOLOGY | Facility: HOSPITAL | Age: 29
End: 2024-05-02
Payer: MEDICAID

## 2024-05-02 VITALS — BODY MASS INDEX: 29.41 KG/M2 | WEIGHT: 166 LBS

## 2024-05-02 DIAGNOSIS — Z34.83 SUPERVISION OF NORMAL INTRAUTERINE PREGNANCY IN MULTIGRAVIDA IN THIRD TRIMESTER (HHS-HCC): Primary | ICD-10-CM

## 2024-05-02 DIAGNOSIS — Z34.82 SUPERVISION OF NORMAL INTRAUTERINE PREGNANCY IN MULTIGRAVIDA IN SECOND TRIMESTER (HHS-HCC): ICD-10-CM

## 2024-05-02 DIAGNOSIS — A59.01 TRICHOMONAL VAGINITIS DURING PREGNANCY IN SECOND TRIMESTER (HHS-HCC): ICD-10-CM

## 2024-05-02 DIAGNOSIS — D64.9 LOW BLOOD HEMOGLOBIN A2: ICD-10-CM

## 2024-05-02 DIAGNOSIS — O23.592 TRICHOMONAL VAGINITIS DURING PREGNANCY IN SECOND TRIMESTER (HHS-HCC): ICD-10-CM

## 2024-05-02 PROCEDURE — 87081 CULTURE SCREEN ONLY: CPT | Performed by: OBSTETRICS & GYNECOLOGY

## 2024-05-02 PROCEDURE — 99213 OFFICE O/P EST LOW 20 MIN: CPT | Mod: TH | Performed by: OBSTETRICS & GYNECOLOGY

## 2024-05-02 PROCEDURE — 99213 OFFICE O/P EST LOW 20 MIN: CPT | Performed by: OBSTETRICS & GYNECOLOGY

## 2024-05-02 ASSESSMENT — ENCOUNTER SYMPTOMS
DEPRESSION: 0
OCCASIONAL FEELINGS OF UNSTEADINESS: 1
LOSS OF SENSATION IN FEET: 0

## 2024-05-02 NOTE — PROGRESS NOTES
SUBJECTIVE  Skinny Arreola is a 28 y.o.  at 36w5d with an estimated date of delivery of 2024, by Ultrasound who presents for a routine prenatal visit.    She denies loss of fluid, vaginal bleeding, regular contractions/cramping, and decreased fetal movement.     OBJECTIVE  Vitals:    24 1554   Weight: 75.3 kg (166 lb)          ASSESSMENT & PLAN    Supervision of normal intrauterine pregnancy in multigravida in third trimester ([facility])  - GBS today  - Desires 39 week IOL, requested today    Follow up in 1 weeks for return OB visit.    Анна Elder MD  Obstetrics & Gynecology  24    No

## 2024-05-03 NOTE — TELEPHONE ENCOUNTER
Patient scheduled for 5/19@ 2pm   IOL letter sent.    ----- Message from Анна Elder MD sent at 5/2/2024  4:03 PM EDT -----  Regarding: IOL  Can we request risk reducing IOL at 39 weeks? Thanks!    анна

## 2024-05-05 LAB — GP B STREP GENITAL QL CULT: NORMAL

## 2024-05-10 ENCOUNTER — ROUTINE PRENATAL (OUTPATIENT)
Dept: OBSTETRICS AND GYNECOLOGY | Facility: HOSPITAL | Age: 29
End: 2024-05-10
Payer: MEDICAID

## 2024-05-10 VITALS — WEIGHT: 165 LBS | SYSTOLIC BLOOD PRESSURE: 107 MMHG | DIASTOLIC BLOOD PRESSURE: 70 MMHG | BODY MASS INDEX: 29.23 KG/M2

## 2024-05-10 DIAGNOSIS — Z3A.37 37 WEEKS GESTATION OF PREGNANCY (HHS-HCC): ICD-10-CM

## 2024-05-10 DIAGNOSIS — A59.01 TRICHOMONAL VAGINITIS DURING PREGNANCY IN SECOND TRIMESTER (HHS-HCC): ICD-10-CM

## 2024-05-10 DIAGNOSIS — O23.592 TRICHOMONAL VAGINITIS DURING PREGNANCY IN SECOND TRIMESTER (HHS-HCC): ICD-10-CM

## 2024-05-10 DIAGNOSIS — Z34.83 SUPERVISION OF NORMAL INTRAUTERINE PREGNANCY IN MULTIGRAVIDA IN THIRD TRIMESTER (HHS-HCC): ICD-10-CM

## 2024-05-10 DIAGNOSIS — D64.9 LOW BLOOD HEMOGLOBIN A2: ICD-10-CM

## 2024-05-10 DIAGNOSIS — R45.89 DEPRESSED MOOD: Primary | ICD-10-CM

## 2024-05-10 PROCEDURE — 87661 TRICHOMONAS VAGINALIS AMPLIF: CPT | Performed by: ADVANCED PRACTICE MIDWIFE

## 2024-05-10 PROCEDURE — 99213 OFFICE O/P EST LOW 20 MIN: CPT | Mod: TH | Performed by: ADVANCED PRACTICE MIDWIFE

## 2024-05-10 PROCEDURE — 99213 OFFICE O/P EST LOW 20 MIN: CPT | Performed by: ADVANCED PRACTICE MIDWIFE

## 2024-05-10 NOTE — PROGRESS NOTES
Subjective     Skinny Arreola is a 28 y.o.  at 37w6d with a working estimated date of delivery of 2024, by Ultrasound who presents for a routine prenatal visit.     She denies vaginal bleeding, leakage of fluid, decreased fetal movements, or contractions.    Yeast infection - did not complete medication, denies symptoms     Trich - finished treatment again after 3/18, denies sexual intercourse since    Plans to deliver at [facility] in [place] - sending our records to them today. Planning to move to [place] tomorrow for family support.    Objective   Physical Exam:   Weight: 74.8 kg (165 lb)  Expected Total Weight Gain: Could not be calculated   Pregravid BMI: Could not be calculated  BP: 107/70  Fetal Heart Rate: 140 Fundal Height (cm): 37 cm             Postpartum Depression: High Risk (2024)    [place]  Depression Scale     Last EPDS Total Score: 23     Last EPDS Self Harm Result: Sometimes        Problem List Items Addressed This Visit       Trichomonal vaginitis during pregnancy in second trimester ([facility])    Overview     Certified letter sent due to patient unable to be contacted by phone; NAY at next visit.         Relevant Orders    Trichomonas vaginalis, Amplified    Supervision of normal intrauterine pregnancy in multigravida in third trimester ([facility])    Low blood hemoglobin A2    Overview     Per diagnostics, not likely significant.         Depressed mood - Primary    Overview     Referred to  IOP on 3/18         37 weeks gestation of pregnancy ([facility])       Reviewed neg GBS  ROR for patient to have prenatal records sent to [facility]  Trich NAY sent  Discussed PP birth control options, encourages LARCs, patient interested in PP IUD    Reviewed s/sx of labor, warning signs, fetal movement counts, and when to call provider  Follow up in 1 week for a routine prenatal visit if plans fall through with [place]    Paige DURBINN, RN, SNM     I was  present with the APRN student who participated in the documentation of this note. I have personally seen and examined the patient and performed the medical decision-making components. I have reviewed the APRN student documentation and verified the findings in the note as written with additions or exceptions as stated in the body of this note.  Roxanne RUTHERFORD

## 2024-05-11 LAB — T VAGINALIS RRNA SPEC QL NAA+PROBE: NEGATIVE

## 2024-06-12 ENCOUNTER — DOCUMENTATION (OUTPATIENT)
Dept: OBSTETRICS AND GYNECOLOGY | Facility: HOSPITAL | Age: 29
End: 2024-06-12
Payer: MEDICARE